# Patient Record
Sex: FEMALE | Race: ASIAN | NOT HISPANIC OR LATINO | URBAN - METROPOLITAN AREA
[De-identification: names, ages, dates, MRNs, and addresses within clinical notes are randomized per-mention and may not be internally consistent; named-entity substitution may affect disease eponyms.]

---

## 2017-09-28 ENCOUNTER — EMERGENCY (EMERGENCY)
Facility: HOSPITAL | Age: 47
LOS: 1 days | Discharge: HOME | End: 2017-09-28
Admitting: EMERGENCY MEDICINE
Payer: MEDICAID

## 2017-09-28 VITALS
TEMPERATURE: 98 F | RESPIRATION RATE: 20 BRPM | HEART RATE: 78 BPM | SYSTOLIC BLOOD PRESSURE: 118 MMHG | DIASTOLIC BLOOD PRESSURE: 75 MMHG

## 2017-09-28 PROCEDURE — 99284 EMERGENCY DEPT VISIT MOD MDM: CPT

## 2017-09-28 RX ORDER — IBUPROFEN 200 MG
600 TABLET ORAL ONCE
Qty: 0 | Refills: 0 | Status: COMPLETED | OUTPATIENT
Start: 2017-09-28 | End: 2017-09-28

## 2017-09-28 RX ADMIN — Medication 600 MILLIGRAM(S): at 22:01

## 2017-09-28 NOTE — ED PROVIDER NOTE - MEDICAL DECISION MAKING DETAILS
47y F with no PMHx presents to the ED c/o left sided neck pain, intermittent for the last 2 days, worsening today.  Pt states she experience similar s/x one week prior that resolve.  No hx of migraines or headache. States pain worse when waking up in morning and working out: msk neck pain, NSAIDS, heat packs, fu with pmd

## 2017-09-28 NOTE — ED PROVIDER NOTE - PHYSICAL EXAMINATION
cervical: slight tenderness to left sided cervical paraspinal area, positive ROM, no muscle spasm noted   pt ambulating

## 2017-09-28 NOTE — ED ADULT TRIAGE NOTE - CHIEF COMPLAINT QUOTE
Pt co pain to left side of head and back of neck x 2 days. Pt denies nausea and vomiting. Pt did not take any pain medication today.

## 2017-09-28 NOTE — ED PROVIDER NOTE - OBJECTIVE STATEMENT
47y F with no PMHx presents to the ED for left paraspinal cervical pain worsening today. Pt states she experience similar s/x one week prior that resolve. Today s/x worsen prompting for ED visit. No hx of migraines or headache. States pain worse when waking up in morning and working out. Denies coughing, congestion, fever, chills, shoulder pain, CP, SOB, sore throat, or recent travel. 47y F with no PMHx presents to the ED c/o left sided neck pain, intermittent for the last 2 days, worsening today.  Pt states she experience similar s/x one week prior that resolve.  No hx of migraines or headache. States pain worse when waking up in morning and working out. Denies coughing, congestion, fever, chills, shoulder pain, neck stiffness, CP, SOB, sore throat, or recent travel. Pt has not taken anything for the pain. Denies any fall or trauma. 47y F with no PMHx presents to the ED c/o left sided neck pain, intermittent for the last 2 days, worsening today.  Pt states she experience similar s/x one week prior that resolve.  No hx of migraines or headache. States pain worse when waking up in morning and working out. Denies coughing, congestion, fever, chills, shoulder pain, neck stiffness, CP, SOB, sore throat, or recent travel. Denies any current headache. Pt has not taken anything for the pain. Denies any fall or trauma.

## 2018-01-07 ENCOUNTER — EMERGENCY (EMERGENCY)
Facility: HOSPITAL | Age: 48
LOS: 1 days | Discharge: HOME | End: 2018-01-07
Attending: EMERGENCY MEDICINE | Admitting: EMERGENCY MEDICINE
Payer: MEDICAID

## 2018-01-07 VITALS
DIASTOLIC BLOOD PRESSURE: 65 MMHG | SYSTOLIC BLOOD PRESSURE: 101 MMHG | OXYGEN SATURATION: 100 % | RESPIRATION RATE: 16 BRPM | HEART RATE: 87 BPM

## 2018-01-07 VITALS
TEMPERATURE: 100 F | SYSTOLIC BLOOD PRESSURE: 102 MMHG | OXYGEN SATURATION: 100 % | RESPIRATION RATE: 17 BRPM | HEART RATE: 108 BPM | HEIGHT: 61 IN | DIASTOLIC BLOOD PRESSURE: 72 MMHG | WEIGHT: 154.98 LBS

## 2018-01-07 LAB
ALBUMIN SERPL ELPH-MCNC: 4 G/DL — SIGNIFICANT CHANGE UP (ref 3.3–5)
ALP SERPL-CCNC: 75 U/L — SIGNIFICANT CHANGE UP (ref 40–120)
ALT FLD-CCNC: 28 U/L — SIGNIFICANT CHANGE UP (ref 4–33)
AST SERPL-CCNC: 37 U/L — HIGH (ref 4–32)
BASOPHILS # BLD AUTO: 0.03 K/UL — SIGNIFICANT CHANGE UP (ref 0–0.2)
BASOPHILS NFR BLD AUTO: 0.6 % — SIGNIFICANT CHANGE UP (ref 0–2)
BILIRUB SERPL-MCNC: 0.2 MG/DL — SIGNIFICANT CHANGE UP (ref 0.2–1.2)
BUN SERPL-MCNC: 8 MG/DL — SIGNIFICANT CHANGE UP (ref 7–23)
CALCIUM SERPL-MCNC: 8.7 MG/DL — SIGNIFICANT CHANGE UP (ref 8.4–10.5)
CHLORIDE SERPL-SCNC: 101 MMOL/L — SIGNIFICANT CHANGE UP (ref 98–107)
CO2 SERPL-SCNC: 27 MMOL/L — SIGNIFICANT CHANGE UP (ref 22–31)
CREAT SERPL-MCNC: 0.96 MG/DL — SIGNIFICANT CHANGE UP (ref 0.5–1.3)
EOSINOPHIL # BLD AUTO: 0.02 K/UL — SIGNIFICANT CHANGE UP (ref 0–0.5)
EOSINOPHIL NFR BLD AUTO: 0.4 % — SIGNIFICANT CHANGE UP (ref 0–6)
GLUCOSE SERPL-MCNC: 88 MG/DL — SIGNIFICANT CHANGE UP (ref 70–99)
HCT VFR BLD CALC: 39.8 % — SIGNIFICANT CHANGE UP (ref 34.5–45)
HGB BLD-MCNC: 13.1 G/DL — SIGNIFICANT CHANGE UP (ref 11.5–15.5)
IMM GRANULOCYTES # BLD AUTO: 0.01 # — SIGNIFICANT CHANGE UP
IMM GRANULOCYTES NFR BLD AUTO: 0.2 % — SIGNIFICANT CHANGE UP (ref 0–1.5)
LYMPHOCYTES # BLD AUTO: 1.65 K/UL — SIGNIFICANT CHANGE UP (ref 1–3.3)
LYMPHOCYTES # BLD AUTO: 35.7 % — SIGNIFICANT CHANGE UP (ref 13–44)
MCHC RBC-ENTMCNC: 27.8 PG — SIGNIFICANT CHANGE UP (ref 27–34)
MCHC RBC-ENTMCNC: 32.9 % — SIGNIFICANT CHANGE UP (ref 32–36)
MCV RBC AUTO: 84.3 FL — SIGNIFICANT CHANGE UP (ref 80–100)
MONOCYTES # BLD AUTO: 0.47 K/UL — SIGNIFICANT CHANGE UP (ref 0–0.9)
MONOCYTES NFR BLD AUTO: 10.2 % — SIGNIFICANT CHANGE UP (ref 2–14)
NEUTROPHILS # BLD AUTO: 2.44 K/UL — SIGNIFICANT CHANGE UP (ref 1.8–7.4)
NEUTROPHILS NFR BLD AUTO: 52.9 % — SIGNIFICANT CHANGE UP (ref 43–77)
NRBC # FLD: 0 — SIGNIFICANT CHANGE UP
PLATELET # BLD AUTO: 251 K/UL — SIGNIFICANT CHANGE UP (ref 150–400)
PMV BLD: 9.8 FL — SIGNIFICANT CHANGE UP (ref 7–13)
POTASSIUM SERPL-MCNC: 4.2 MMOL/L — SIGNIFICANT CHANGE UP (ref 3.5–5.3)
POTASSIUM SERPL-SCNC: 4.2 MMOL/L — SIGNIFICANT CHANGE UP (ref 3.5–5.3)
PROT SERPL-MCNC: 7.5 G/DL — SIGNIFICANT CHANGE UP (ref 6–8.3)
RBC # BLD: 4.72 M/UL — SIGNIFICANT CHANGE UP (ref 3.8–5.2)
RBC # FLD: 12.7 % — SIGNIFICANT CHANGE UP (ref 10.3–14.5)
SODIUM SERPL-SCNC: 140 MMOL/L — SIGNIFICANT CHANGE UP (ref 135–145)
WBC # BLD: 4.62 K/UL — SIGNIFICANT CHANGE UP (ref 3.8–10.5)
WBC # FLD AUTO: 4.62 K/UL — SIGNIFICANT CHANGE UP (ref 3.8–10.5)

## 2018-01-07 PROCEDURE — 71046 X-RAY EXAM CHEST 2 VIEWS: CPT | Mod: 26

## 2018-01-07 PROCEDURE — 99284 EMERGENCY DEPT VISIT MOD MDM: CPT

## 2018-01-07 RX ORDER — SODIUM CHLORIDE 9 MG/ML
1000 INJECTION INTRAMUSCULAR; INTRAVENOUS; SUBCUTANEOUS ONCE
Qty: 0 | Refills: 0 | Status: COMPLETED | OUTPATIENT
Start: 2018-01-07 | End: 2018-01-07

## 2018-01-07 RX ORDER — IBUPROFEN 200 MG
400 TABLET ORAL ONCE
Qty: 0 | Refills: 0 | Status: COMPLETED | OUTPATIENT
Start: 2018-01-07 | End: 2018-01-07

## 2018-01-07 RX ORDER — METOCLOPRAMIDE HCL 10 MG
10 TABLET ORAL ONCE
Qty: 0 | Refills: 0 | Status: COMPLETED | OUTPATIENT
Start: 2018-01-07 | End: 2018-01-07

## 2018-01-07 RX ADMIN — Medication 400 MILLIGRAM(S): at 16:34

## 2018-01-07 RX ADMIN — Medication 10 MILLIGRAM(S): at 16:34

## 2018-01-07 RX ADMIN — SODIUM CHLORIDE 1000 MILLILITER(S): 9 INJECTION INTRAMUSCULAR; INTRAVENOUS; SUBCUTANEOUS at 16:35

## 2018-01-07 NOTE — ED PROVIDER NOTE - OBJECTIVE STATEMENT
Pt is a 46 y/o F with no significant medical history  who presents to the ED with nonproductive cough, sore throat, chills, and HA x2 days. She also had a subjective fever last night and took Tylenol. She takes no medications, no flu shot, no sick contacts, no recent abx   Denies any CP, SOB, myalgia Pt is a 48 y/o F with no significant medical history who presents to the ED with nonproductive cough, sore throat, chills, and HA x2 days. She also had a fever last night (tmax 101) and took Tylenol. She takes no medications, no flu shot, no sick contacts, no recent abx   Denies any CP, SOB, nor myalgia. Pt is a 46 y/o F with no significant medical history who presents to the ED with nonproductive cough, sore throat, chills, and HA x2 days. She also had a fever last night (tmax 101) and took Tylenol. She takes no medications, no flu shot, no sick contacts, no recent abx. Did not get flu shot.  Denies any CP, SOB, nor myalgia.

## 2018-01-07 NOTE — ED PROVIDER NOTE - CHIEF COMPLAINT
The patient is a 47y Female complaining of The patient is a 47y Female complaining of fever, cough, headache

## 2018-01-07 NOTE — ED PROVIDER NOTE - MEDICAL DECISION MAKING DETAILS
Pt is a 46 y/o F who presents to the ED with likely viral syndromes, get CXR to rule out pna, labs, IV fluids, Motrin, Reglan, and reassess.

## 2018-01-07 NOTE — ED PROVIDER NOTE - CARE PLAN
Principal Discharge DX:	Viral syndrome Principal Discharge DX:	Viral syndrome  Instructions for follow-up, activity and diet:	Follow up with your PMD within 48-72 hours.  Rest, increase fluids. Take tylenol 650mg every 6 hours for pain or temp greater than 99.9. Worsening or continued fever, chills, weakness, nausea, vomiting, abdominal pain return to ER

## 2018-01-07 NOTE — ED PROVIDER NOTE - PROGRESS NOTE DETAILS
MARIOLA Malik: Received sign out from MARIOLA JEFFERSON to reassess patient and follow up on CXR/lab results. Pt reassessed, feeling better, VS improved, headache resolved. CXR and labs wnl. Will dc w/ NSAIDs, supportive tx and PMD follow up.

## 2018-01-07 NOTE — ED PROVIDER NOTE - PLAN OF CARE
Follow up with your PMD within 48-72 hours.  Rest, increase fluids. Take tylenol 650mg every 6 hours for pain or temp greater than 99.9. Worsening or continued fever, chills, weakness, nausea, vomiting, abdominal pain return to ER

## 2018-01-07 NOTE — ED PROVIDER NOTE - NEUROLOGICAL, MLM
Alert and oriented, no focal deficits, no motor or sensory deficits. Alert and oriented, no focal deficits, no motor or sensory deficits. no meningismus

## 2019-10-05 ENCOUNTER — EMERGENCY (EMERGENCY)
Facility: HOSPITAL | Age: 49
LOS: 1 days | Discharge: HOME | End: 2019-10-05
Attending: EMERGENCY MEDICINE | Admitting: EMERGENCY MEDICINE
Payer: COMMERCIAL

## 2019-10-05 VITALS
RESPIRATION RATE: 20 BRPM | TEMPERATURE: 99 F | SYSTOLIC BLOOD PRESSURE: 98 MMHG | HEART RATE: 91 BPM | OXYGEN SATURATION: 100 % | DIASTOLIC BLOOD PRESSURE: 74 MMHG

## 2019-10-05 VITALS
OXYGEN SATURATION: 100 % | HEART RATE: 84 BPM | SYSTOLIC BLOOD PRESSURE: 110 MMHG | RESPIRATION RATE: 18 BRPM | DIASTOLIC BLOOD PRESSURE: 82 MMHG

## 2019-10-05 LAB
ANION GAP SERPL CALC-SCNC: 11 MMO/L — SIGNIFICANT CHANGE UP (ref 7–14)
APPEARANCE UR: CLEAR — SIGNIFICANT CHANGE UP
BILIRUB UR-MCNC: NEGATIVE — SIGNIFICANT CHANGE UP
BLOOD UR QL VISUAL: NEGATIVE — SIGNIFICANT CHANGE UP
BUN SERPL-MCNC: 9 MG/DL — SIGNIFICANT CHANGE UP (ref 7–23)
CALCIUM SERPL-MCNC: 9.7 MG/DL — SIGNIFICANT CHANGE UP (ref 8.4–10.5)
CHLORIDE SERPL-SCNC: 103 MMOL/L — SIGNIFICANT CHANGE UP (ref 98–107)
CO2 SERPL-SCNC: 26 MMOL/L — SIGNIFICANT CHANGE UP (ref 22–31)
COLOR SPEC: COLORLESS — SIGNIFICANT CHANGE UP
CREAT SERPL-MCNC: 0.89 MG/DL — SIGNIFICANT CHANGE UP (ref 0.5–1.3)
GLUCOSE SERPL-MCNC: 95 MG/DL — SIGNIFICANT CHANGE UP (ref 70–99)
GLUCOSE UR-MCNC: NEGATIVE — SIGNIFICANT CHANGE UP
HCT VFR BLD CALC: 41.4 % — SIGNIFICANT CHANGE UP (ref 34.5–45)
HGB BLD-MCNC: 13.2 G/DL — SIGNIFICANT CHANGE UP (ref 11.5–15.5)
KETONES UR-MCNC: NEGATIVE — SIGNIFICANT CHANGE UP
LEUKOCYTE ESTERASE UR-ACNC: NEGATIVE — SIGNIFICANT CHANGE UP
MCHC RBC-ENTMCNC: 25.7 PG — LOW (ref 27–34)
MCHC RBC-ENTMCNC: 31.9 % — LOW (ref 32–36)
MCV RBC AUTO: 80.7 FL — SIGNIFICANT CHANGE UP (ref 80–100)
NITRITE UR-MCNC: NEGATIVE — SIGNIFICANT CHANGE UP
NRBC # FLD: 0 K/UL — SIGNIFICANT CHANGE UP (ref 0–0)
PH UR: 6.5 — SIGNIFICANT CHANGE UP (ref 5–8)
PLATELET # BLD AUTO: 353 K/UL — SIGNIFICANT CHANGE UP (ref 150–400)
PMV BLD: 9.2 FL — SIGNIFICANT CHANGE UP (ref 7–13)
POTASSIUM SERPL-MCNC: 4.5 MMOL/L — SIGNIFICANT CHANGE UP (ref 3.5–5.3)
POTASSIUM SERPL-SCNC: 4.5 MMOL/L — SIGNIFICANT CHANGE UP (ref 3.5–5.3)
PROT UR-MCNC: NEGATIVE — SIGNIFICANT CHANGE UP
RBC # BLD: 5.13 M/UL — SIGNIFICANT CHANGE UP (ref 3.8–5.2)
RBC # FLD: 12.2 % — SIGNIFICANT CHANGE UP (ref 10.3–14.5)
SODIUM SERPL-SCNC: 140 MMOL/L — SIGNIFICANT CHANGE UP (ref 135–145)
SP GR SPEC: 1.01 — SIGNIFICANT CHANGE UP (ref 1–1.04)
UROBILINOGEN FLD QL: NORMAL — SIGNIFICANT CHANGE UP
WBC # BLD: 7.91 K/UL — SIGNIFICANT CHANGE UP (ref 3.8–10.5)
WBC # FLD AUTO: 7.91 K/UL — SIGNIFICANT CHANGE UP (ref 3.8–10.5)

## 2019-10-05 PROCEDURE — 93010 ELECTROCARDIOGRAM REPORT: CPT

## 2019-10-05 PROCEDURE — 71046 X-RAY EXAM CHEST 2 VIEWS: CPT | Mod: 26

## 2019-10-05 PROCEDURE — 99284 EMERGENCY DEPT VISIT MOD MDM: CPT | Mod: 25

## 2019-10-05 NOTE — ED PROVIDER NOTE - PATIENT PORTAL LINK FT
You can access the FollowMyHealth Patient Portal offered by [facility] by registering at the following website: [email or web address]. By joining [employer]’s FollowMyHealth portal, you will also be able to view your health information using other applications (apps) compatible with our system.

## 2019-10-05 NOTE — ED PROVIDER NOTE - NSFOLLOWUPINSTRUCTIONS_ED_ALL_ED_FT
Anxiety    Generalized anxiety disorder (CRISTINA) is a mental disorder. It is defined as anxiety that is not necessarily related to specific events or activities or is out of proportion to said events. Symptoms include restlessness, fatigue, difficulty concentrations, irritability and difficulty concentrating. It may interfere with life functions, including relationships, work, and school. If you were started on a medication, make sure to take exactly as prescribed and follow up with a psychiatrist.    SEEK IMMEDIATE MEDICAL CARE IF YOU HAVE ANY OF THE FOLLOWING SYMPTOMS: thoughts about hurting killing yourself, thoughts about hurting or killing somebody else, hallucinations, or worsening depression.

## 2019-10-05 NOTE — ED PROVIDER NOTE - OBJECTIVE STATEMENT
Prior to arrival patient Prior to arrival patient was in a verbal altercation with her family and got very stressed out, felt light headed, then stopped talking for a short period of time. Prior to arrival patient was in a verbal altercation with her family and got very stressed out, felt light headed, then stopped talking for a short period of time. Afterwards she had a rapid return to baseline mentation, did not appear to have a post-ictal period, was talking and normal again. Said that she felt like she was having tingling in her hands and feet, and due to this was brought in for further evaluation. No tonic-clonic movements, no shaking, no urinary or fecal incontinence. No nausea or vomiting. Total time from abnormal to normal approximately 8 minutes. Denies other symptoms of concern at this time.

## 2019-10-05 NOTE — ED PROVIDER NOTE - CLINICAL SUMMARY MEDICAL DECISION MAKING FREE TEXT BOX
Impression/plan:  50 yo F c/o symptoms c/w vasovagal syncope vs near-syncope.  The context of her presentation is most convincing: onset during an emotional argument with family.  Normal exam. She did not have a full LOC.  Rapid return to normal without a post-ictal period.  Few cardiac RFs, normal ECG, normal VS, normal exam.  Plan:  cbc, bmp, ua, reassess.  CT head not indicated, given that likelihood of seizure is low, and even if it was a seizure, CT head is unlikely to add any pertinent information in a patient with a normal neurologic exam.

## 2019-10-05 NOTE — ED PROVIDER NOTE - NSFOLLOWUPCLINICS_GEN_ALL_ED_FT
[facility]  Cardiology  [address] [place] [address]  Phone: [phone]  Fax:   Follow Up Time: 1-3 Days

## 2019-10-05 NOTE — ED ADULT NURSE NOTE - OBJECTIVE STATEMENT
received pt A&ox3 in no apparent distress at this time. #18g IVL to L AC, placed prior to arrival, intact, no s/s of infiltration noted at this time. bloods drawn and sent to the lab. ivf infusing as per MD order. cardiac monitor in place, HR low 30s. MD aware. will monitor closely. dispo pending received pt A&ox3 in no apparent distress at this time. #20g IVL to L FA, bloods drawn and sent to the lab. family and MD at bedside, vss. cardiac monitor in place. dispo pending.

## 2019-10-05 NOTE — ED ADULT TRIAGE NOTE - CHIEF COMPLAINT QUOTE
Denies PMH as per daughter pt had an argument with someone over the phone, began to yell and then became "unresponsive for 10 minutes" with one arm shaking. pt confused and does not remember the event. daughter states pt had a panic attack 10 years ago that presented similar but was not as long or as severe.

## 2019-10-05 NOTE — ED PROVIDER NOTE - PHYSICAL EXAMINATION
VS: wnl.  Physical Exam: adult M, NAD, NCAT, PERRL, EOMI, neck supple, no tongue maceration/bruising, RRR, CTA B, Abd: s/nd/nt, Ext: no edema, Neuro: AAOx3, ambulates w/o diff, strength 5/5 & symmetric throughout.

## 2019-10-05 NOTE — ED PROVIDER NOTE - ATTENDING CONTRIBUTION TO CARE
MD Levine:  patient seen and evaluated with the resident.  I was present for key portions of the History & Physical, and I agree with the Impression & Plan.  MD Levine:  49F, bib EMS after possible seizure.  Onset:  1 hr PTA.  Duration "8 minutes" from start of episode to totally normal.  Context:  was having a heated emotionally charged argument with family over the phone when she began to hyperventilate, feel "tingly" and became unresponsive.  Quality of "unresponsiveness" : was awake with eyes open but not speaking.  Associated Sx: no tongue-biting, no full LOC, no loss of urine/feces, no whole-body clonus.  Better: time; feels like her normal self.    VS: wnl.  Physical Exam: adult M, NAD, NCAT, PERRL, EOMI, neck supple, no tongue maceration/bruising, RRR, CTA B, Abd: s/nd/nt, Ext: no edema, Neuro: AAOx3, ambulates w/o diff, strength 5/5 & symmetric throughout.  Impression/plan:  48 yo F c/o symptoms c/w vasovagal syncope vs near-syncope.  The context of her presentation is most convincing: onset during an emotional argument with family.  Normal exam. She did not have a full LOC.  Rapid return to normal without a post-ictal period.  Few cardiac RFs, normal ECG, normal VS, normal exam.  Plan:  cbc, bmp, ua, reassess.  CT head not indicated, given that likelihood of seizure is low, and even if it was a seizure, CT head is unlikely to add any pertinent information in a patient with a normal neurologic exam.  There are no H&P features of a more serious etiology such as pericarditis, myocarditis, pulmonary embolism,  pneumothorax, pneumonia, zoster, or esophageal perforation.  History not abrupt in onset, tearing or ripping; pulses symmetric; no evidence of aortic dissection.

## 2019-10-05 NOTE — ED PROVIDER NOTE - NS ED ROS FT
Associated Sx: no tongue-biting, no full LOC, no loss of urine/feces, no whole-body clonus.  Better: time; feels like her normal self.

## 2021-11-06 ENCOUNTER — EMERGENCY (EMERGENCY)
Facility: HOSPITAL | Age: 51
LOS: 1 days | Discharge: HOME | End: 2021-11-06
Attending: HOSPITALIST | Admitting: HOSPITALIST
Payer: COMMERCIAL

## 2021-11-06 VITALS
HEIGHT: 61 IN | TEMPERATURE: 97 F | HEART RATE: 77 BPM | SYSTOLIC BLOOD PRESSURE: 122 MMHG | RESPIRATION RATE: 12 BRPM | DIASTOLIC BLOOD PRESSURE: 74 MMHG

## 2021-11-06 PROCEDURE — 99283 EMERGENCY DEPT VISIT LOW MDM: CPT

## 2021-11-06 RX ORDER — OXYCODONE AND ACETAMINOPHEN 5; 325 MG/1; MG/1
1 TABLET ORAL ONCE
Refills: 0 | Status: DISCONTINUED | OUTPATIENT
Start: 2021-11-06 | End: 2021-11-06

## 2021-11-06 RX ADMIN — OXYCODONE AND ACETAMINOPHEN 1 TABLET(S): 5; 325 TABLET ORAL at 19:26

## 2021-11-06 NOTE — ED PROVIDER NOTE - NSFOLLOWUPINSTRUCTIONS_ED_ALL_ED_FT
Please take percocet as prescribed, caution it may make you drowsy.  You can also take ibuprofen as needed for pain, 600mg every 6-8 hours, take with food.    Please call the dental clinic here for evaluation of your tooth [phone]. it is here in the main building at [facility], first floor.

## 2021-11-06 NOTE — ED ADULT TRIAGE NOTE - CHIEF COMPLAINT QUOTE
pt c/o dental pain s/p chipping tooth on lower jaw 7/10 with some relief with Tylenol and ora gel. denies PMH.

## 2021-11-06 NOTE — ED PROVIDER NOTE - PATIENT PORTAL LINK FT
You can access the FollowMyHealth Patient Portal offered by [facility] by registering at the following website: [email or web address]. By joining [employer]’s FollowMyHealth portal, you will also be able to view your health information using other applications (apps) compatible with our system.

## 2021-11-06 NOTE — ED PROVIDER NOTE - NS_ATTENDINGSCRIBE_ED_ALL_ED
I personally performed the service described in the documentation recorded by the scribe in my presence, and it accurately and completely records my words and actions. verbal instruction

## 2021-11-06 NOTE — ED PROVIDER NOTE - OBJECTIVE STATEMENT
50 y/o F presents to the ED c/o dental pain to her left lower molar. Pt states she went to see her dentist around 10 days ago, who stated that she needs to get the tooth removed. Pt noticed 2 days ago that the tooth cracked twice and the pain increased. Pt called the dentist but there was no available appointment until December. Pt states the tooth is sensitive to cold and has difficulty chewing on that side, but able to chew on the right. Pt states she took Tylenol and Orajel, which relieved her pain. Pt denies fever and swelling.

## 2021-11-06 NOTE — ED ADULT NURSE NOTE - OBJECTIVE STATEMENT
52y/o female A&ox4, amb, received in int12. pt c/o L sided tooth pain. pt broke back L tooth while eating yesterday. pt unable to chew on L side at this time, c/o sensitivity. respirations even and unlabored. airway patent. medicated as per MD orders. bed in lowest position, side rails up, call bell in hand, safety maintained. awaiting further orders. will continue to monitor.
